# Patient Record
Sex: FEMALE | Race: WHITE | NOT HISPANIC OR LATINO | Employment: PART TIME | URBAN - METROPOLITAN AREA
[De-identification: names, ages, dates, MRNs, and addresses within clinical notes are randomized per-mention and may not be internally consistent; named-entity substitution may affect disease eponyms.]

---

## 2024-11-14 ENCOUNTER — OFFICE VISIT (OUTPATIENT)
Dept: URGENT CARE | Age: 84
End: 2024-11-14
Payer: MEDICARE

## 2024-11-14 VITALS
OXYGEN SATURATION: 94 % | SYSTOLIC BLOOD PRESSURE: 142 MMHG | DIASTOLIC BLOOD PRESSURE: 72 MMHG | TEMPERATURE: 97.7 F | HEART RATE: 72 BPM

## 2024-11-14 DIAGNOSIS — S81.802A TRAUMATIC OPEN WOUND OF LOWER LEG WITH INFECTION, LEFT, INITIAL ENCOUNTER: Primary | ICD-10-CM

## 2024-11-14 DIAGNOSIS — L08.9 TRAUMATIC OPEN WOUND OF LOWER LEG WITH INFECTION, LEFT, INITIAL ENCOUNTER: Primary | ICD-10-CM

## 2024-11-14 PROCEDURE — 1159F MED LIST DOCD IN RCRD: CPT | Performed by: NURSE PRACTITIONER

## 2024-11-14 PROCEDURE — 99203 OFFICE O/P NEW LOW 30 MIN: CPT | Performed by: NURSE PRACTITIONER

## 2024-11-14 RX ORDER — ATENOLOL 50 MG/1
50 TABLET ORAL
COMMUNITY
Start: 2024-09-18

## 2024-11-14 RX ORDER — SULFAMETHOXAZOLE AND TRIMETHOPRIM 800; 160 MG/1; MG/1
1 TABLET ORAL 2 TIMES DAILY
Qty: 14 TABLET | Refills: 0 | Status: SHIPPED | OUTPATIENT
Start: 2024-11-14 | End: 2024-11-21

## 2024-11-14 RX ORDER — MUPIROCIN 20 MG/G
1 OINTMENT TOPICAL 3 TIMES DAILY
COMMUNITY
Start: 2024-11-10

## 2024-11-14 RX ORDER — NAPROXEN 375 MG/1
1 TABLET ORAL NIGHTLY PRN
COMMUNITY
Start: 2024-06-06

## 2024-11-14 RX ORDER — LOVASTATIN 40 MG/1
1 TABLET ORAL NIGHTLY
COMMUNITY
Start: 2024-07-10

## 2024-11-14 RX ORDER — ALENDRONATE SODIUM 70 MG/1
TABLET ORAL
COMMUNITY
Start: 2024-05-01

## 2024-11-14 RX ORDER — POTASSIUM CHLORIDE 20 MEQ/1
20 TABLET, EXTENDED RELEASE ORAL
COMMUNITY
Start: 2024-09-18

## 2024-11-14 RX ORDER — HYDROCHLOROTHIAZIDE 25 MG/1
25 TABLET ORAL
COMMUNITY
Start: 2024-09-18

## 2024-11-14 NOTE — PROGRESS NOTES
Subjective   Patient ID: Karla Cerda is a 84 y.o. female. They present today with a chief complaint of Injury (Open womb on left leg).    History of Present Illness  85 yo female coming in for a wound to the left lower leg. She states she was cut by a tag in some new pants and was seen originally in the ER. She states she has been on Keflex and mupirocin but it is not getting better.     Past Medical History  Allergies as of 11/14/2024    (No Known Allergies)       (Not in a hospital admission)       History reviewed. No pertinent past medical history.    History reviewed. No pertinent surgical history.         Review of Systems  Review of Systems:  General: No weight loss, fatigue, anorexia, insomnia, fever, chills.  Cardiac: No chest pain, palpitations, syncope, near syncope.  Pulmonary:  No shortness of breath, cough, hemoptysis  Heme/lymph: No swollen glands, fever, bleeding  Musculoskeletal: No limb pain, joint pain, joint swelling.  Skin: No rashes. Positive open wound to the left lower leg with swelling and redness  Neuro: No numbness, tingling, headaches                                 Objective    Vitals:    11/14/24 1743   BP: 142/72   Pulse: 72   Temp: 36.5 °C (97.7 °F)   SpO2: 94%     No LMP recorded.    Physical Exam  Physical Exam:  General: Vital noted, no distress. Afebrile  Cardiac: Regular rate and rhythm, no murmur  Pulmonary: Lungs clear bilaterally with good aeration. No adventitious breath sounds.  Musculoskeletal: No peripheral edema.   Skin: No rashes. wound measuring 3cm in length noted to the left lower leg. There is edema and puss drainage noted. There is noted erythema around the wound as well   Neuro: No focal neurologic deficits    Procedures    Point of Care Test & Imaging Results from this visit  No results found for this visit on 11/14/24.   No results found.    Diagnostic study results (if any) were reviewed by MICHA Swanson.    Assessment/Plan   Allergies, medications,  history, and pertinent labs/EKGs/Imaging reviewed by MICHA Swanson.     Medical Decision Making  Treatment:Bactrim DS prescribed. Wound care clinic referral given  Differential: 1) infected wound , 2) cellulitis , 3) DVT  Plan: Discussed differential with the pateint. Patient will follow up with the PCP in the next 2-3 days. Return for any worsening symptoms or go to the ER for further evaluation. Patient understands return precautions and discharege insturctions.  Impression:   1) Infected wound of left lower leg.      Orders and Diagnoses  Diagnoses and all orders for this visit:  Traumatic open wound of lower leg with infection, left, initial encounter  -     sulfamethoxazole-trimethoprim (Bactrim DS) 800-160 mg tablet; Take 1 tablet by mouth 2 times a day for 7 days.  -     Referral to Wound Clinic; Future      Medical Admin Record      Patient disposition: Home    Electronically signed by MICHA Swanson  5:57 PM

## 2024-11-18 ENCOUNTER — OFFICE VISIT (OUTPATIENT)
Dept: WOUND CARE | Facility: CLINIC | Age: 84
End: 2024-11-18
Payer: MEDICARE

## 2024-11-18 ENCOUNTER — APPOINTMENT (OUTPATIENT)
Dept: WOUND CARE | Facility: CLINIC | Age: 84
End: 2024-11-18
Payer: MEDICARE

## 2024-11-18 PROCEDURE — 11042 DBRDMT SUBQ TIS 1ST 20SQCM/<: CPT

## 2024-11-18 PROCEDURE — 99213 OFFICE O/P EST LOW 20 MIN: CPT | Mod: 25

## 2024-11-25 ENCOUNTER — OFFICE VISIT (OUTPATIENT)
Dept: WOUND CARE | Facility: CLINIC | Age: 84
End: 2024-11-25
Payer: MEDICARE

## 2024-11-25 PROCEDURE — 11042 DBRDMT SUBQ TIS 1ST 20SQCM/<: CPT

## 2024-12-02 ENCOUNTER — OFFICE VISIT (OUTPATIENT)
Dept: WOUND CARE | Facility: CLINIC | Age: 84
End: 2024-12-02
Payer: MEDICARE

## 2024-12-02 PROCEDURE — 11042 DBRDMT SUBQ TIS 1ST 20SQCM/<: CPT

## 2024-12-09 ENCOUNTER — OFFICE VISIT (OUTPATIENT)
Dept: WOUND CARE | Facility: CLINIC | Age: 84
End: 2024-12-09
Payer: MEDICARE

## 2024-12-09 PROCEDURE — 11042 DBRDMT SUBQ TIS 1ST 20SQCM/<: CPT

## 2024-12-16 ENCOUNTER — OFFICE VISIT (OUTPATIENT)
Dept: WOUND CARE | Facility: CLINIC | Age: 84
End: 2024-12-16
Payer: MEDICARE

## 2024-12-16 PROCEDURE — 11042 DBRDMT SUBQ TIS 1ST 20SQCM/<: CPT

## 2024-12-23 ENCOUNTER — OFFICE VISIT (OUTPATIENT)
Dept: WOUND CARE | Facility: CLINIC | Age: 84
End: 2024-12-23
Payer: MEDICARE

## 2024-12-23 PROCEDURE — 11042 DBRDMT SUBQ TIS 1ST 20SQCM/<: CPT

## 2024-12-30 ENCOUNTER — OFFICE VISIT (OUTPATIENT)
Dept: WOUND CARE | Facility: CLINIC | Age: 84
End: 2024-12-30
Payer: MEDICARE

## 2024-12-30 PROCEDURE — 11042 DBRDMT SUBQ TIS 1ST 20SQCM/<: CPT

## 2025-01-06 ENCOUNTER — APPOINTMENT (OUTPATIENT)
Dept: WOUND CARE | Facility: CLINIC | Age: 85
End: 2025-01-06
Payer: MEDICARE

## 2025-01-13 ENCOUNTER — APPOINTMENT (OUTPATIENT)
Dept: WOUND CARE | Facility: CLINIC | Age: 85
End: 2025-01-13
Payer: COMMERCIAL

## 2025-01-20 ENCOUNTER — APPOINTMENT (OUTPATIENT)
Dept: WOUND CARE | Facility: CLINIC | Age: 85
End: 2025-01-20
Payer: COMMERCIAL

## 2025-01-27 ENCOUNTER — APPOINTMENT (OUTPATIENT)
Dept: WOUND CARE | Facility: CLINIC | Age: 85
End: 2025-01-27
Payer: COMMERCIAL

## 2025-03-26 ENCOUNTER — OFFICE VISIT (OUTPATIENT)
Dept: URGENT CARE | Age: 85
End: 2025-03-26
Payer: MEDICARE

## 2025-03-26 VITALS — DIASTOLIC BLOOD PRESSURE: 79 MMHG | OXYGEN SATURATION: 98 % | SYSTOLIC BLOOD PRESSURE: 151 MMHG | HEART RATE: 70 BPM

## 2025-03-26 DIAGNOSIS — L08.9 SKIN INFECTION: Primary | ICD-10-CM

## 2025-03-26 RX ORDER — DOXYCYCLINE HYCLATE 100 MG
100 TABLET ORAL 2 TIMES DAILY
Qty: 20 TABLET | Refills: 0 | Status: SHIPPED | OUTPATIENT
Start: 2025-03-26 | End: 2025-04-05

## 2025-03-26 ASSESSMENT — ENCOUNTER SYMPTOMS: WOUND: 1

## 2025-03-26 NOTE — PATIENT INSTRUCTIONS
Take the antibiotic with food.  Eat yogurt or take probiotic once a day.   Skin care discussed. Wash with soap and water daily and pat dry. Leave open to room air, if no drainage. Apply antibiotic ointment and nonadherent dressing if draining.   Monitor for signs and symptoms of worsening infection including pain, redness, swelling, red streaks following veins, discharge and fever. Call 911 or go to the nearest ER.

## 2025-03-26 NOTE — PROGRESS NOTES
Subjective   Patient ID: Karla Cerda is a 84 y.o. female. They present today with a chief complaint of Fell leg womb on left leg and Injury.    History of Present Illness  84-yo female presents with wound on the left leg after a fall about a week ago. She sustained skin tears on the left lower extremity.  At that time, she was evaluated by paramedics, but she did not go to the ER. Patient is up-to-date on immunization against tetanus.  She denies being on a blood thinner.  Patient states she noticed at work that the swelling and bruising of the left leg has been worsening over the last week and the wound has not been healing.  Patient denies having diabetes.  Patient has been covering the wound with gauze and bandages.  Daughter is present at bedside and is concerned about blood clots.        History provided by:  Patient   used: No    Injury  Location:  Left lower extremity  Quality:  Patient reports mild pain  Onset quality:  Sudden  Duration:  7 days  Timing:  Constant  Progression:  Worsening  Chronicity:  New  Relieved by:  Nothing  Worsened by:  Nothing      Past Medical History  Allergies as of 03/26/2025    (No Known Allergies)       (Not in a hospital admission)       History reviewed. No pertinent past medical history.    History reviewed. No pertinent surgical history.         Review of Systems  Review of Systems   Skin:  Positive for wound.            Objective    Vitals:    03/26/25 1829   BP: 151/79   Pulse: 70   SpO2: 98%     No LMP recorded.    Physical Exam  Vitals and nursing note reviewed.   Constitutional:       Appearance: Normal appearance.   HENT:      Head: Normocephalic and atraumatic.   Cardiovascular:      Rate and Rhythm: Normal rate and regular rhythm.   Pulmonary:      Effort: Pulmonary effort is normal.      Breath sounds: Normal breath sounds.   Skin:     Comments: One large skin tear noted on the left knee.  Smaller skin tears noted on the left lower extremity.   Dried serosanguineous drainage noted on the dressing.  Left lower extremity with extensive ecchymosis and marked swelling. One hematoma noted on the left shin.  Slightly tender to touch. Left pedal pulses palpable.   Neurological:      Mental Status: She is alert.         Procedures    Point of Care Test & Imaging Results from this visit  No results found for this visit on 03/26/25.   Imaging  No results found.    Cardiology, Vascular, and Other Imaging  No other imaging results found for the past 2 days      Diagnostic study results (if any) were reviewed by MICHA Vicente.    Assessment/Plan   Allergies, medications, history, and pertinent labs/EKGs/Imaging reviewed by MICHA Vicente.     Medical Decision Making  Daughter is advised her primary care provider can order an ultrasound or she can get an ultrasound in the ER.  Doxycycline is prescribed to treat the skin infection.  Patient is referred to wound care clinic.  Daughter states she would take patient to the ER at [facility].    Orders and Diagnoses  Diagnoses and all orders for this visit:  Skin infection  -     doxycycline (Vibra-Tabs) 100 mg tablet; Take 1 tablet (100 mg) by mouth 2 times a day for 10 days. Take with a full glass of water and do not lie down for at least 30 minutes after.  -     Referral to Wound Clinic; Future      Medical Admin Record      Patient disposition: Home    Electronically signed by MICHA Vicente  7:47 PM

## 2025-04-01 ENCOUNTER — OFFICE VISIT (OUTPATIENT)
Dept: WOUND CARE | Facility: CLINIC | Age: 85
End: 2025-04-01
Payer: MEDICARE

## 2025-04-01 PROCEDURE — 11042 DBRDMT SUBQ TIS 1ST 20SQCM/<: CPT

## 2025-04-01 PROCEDURE — 99213 OFFICE O/P EST LOW 20 MIN: CPT | Mod: 25

## 2025-04-08 ENCOUNTER — OFFICE VISIT (OUTPATIENT)
Dept: WOUND CARE | Facility: CLINIC | Age: 85
End: 2025-04-08
Payer: MEDICARE

## 2025-04-08 PROCEDURE — 11042 DBRDMT SUBQ TIS 1ST 20SQCM/<: CPT

## 2025-04-08 PROCEDURE — 11045 DBRDMT SUBQ TISS EACH ADDL: CPT

## 2025-04-15 ENCOUNTER — OFFICE VISIT (OUTPATIENT)
Dept: WOUND CARE | Facility: CLINIC | Age: 85
End: 2025-04-15
Payer: MEDICARE

## 2025-04-15 PROCEDURE — 11042 DBRDMT SUBQ TIS 1ST 20SQCM/<: CPT

## 2025-04-22 ENCOUNTER — APPOINTMENT (OUTPATIENT)
Dept: WOUND CARE | Facility: CLINIC | Age: 85
End: 2025-04-22
Payer: MEDICARE

## 2025-04-29 ENCOUNTER — OFFICE VISIT (OUTPATIENT)
Dept: WOUND CARE | Facility: CLINIC | Age: 85
End: 2025-04-29
Payer: MEDICARE

## 2025-04-29 PROCEDURE — 11042 DBRDMT SUBQ TIS 1ST 20SQCM/<: CPT

## 2025-05-06 ENCOUNTER — APPOINTMENT (OUTPATIENT)
Dept: WOUND CARE | Facility: CLINIC | Age: 85
End: 2025-05-06
Payer: MEDICARE

## 2025-05-13 ENCOUNTER — APPOINTMENT (OUTPATIENT)
Dept: WOUND CARE | Facility: CLINIC | Age: 85
End: 2025-05-13
Payer: MEDICARE